# Patient Record
Sex: FEMALE | Race: WHITE | NOT HISPANIC OR LATINO | Employment: OTHER
[De-identification: names, ages, dates, MRNs, and addresses within clinical notes are randomized per-mention and may not be internally consistent; named-entity substitution may affect disease eponyms.]

---

## 2017-05-18 ENCOUNTER — PRIOR ORIGINAL RECORDS (OUTPATIENT)
Dept: OTHER | Age: 62
End: 2017-05-18

## 2017-06-21 ENCOUNTER — PRIOR ORIGINAL RECORDS (OUTPATIENT)
Dept: OTHER | Age: 62
End: 2017-06-21

## 2017-07-27 ENCOUNTER — PRIOR ORIGINAL RECORDS (OUTPATIENT)
Dept: OTHER | Age: 62
End: 2017-07-27

## 2017-09-13 ENCOUNTER — PRIOR ORIGINAL RECORDS (OUTPATIENT)
Dept: OTHER | Age: 62
End: 2017-09-13

## 2017-11-08 ENCOUNTER — PRIOR ORIGINAL RECORDS (OUTPATIENT)
Dept: OTHER | Age: 62
End: 2017-11-08

## 2017-11-15 ENCOUNTER — PRIOR ORIGINAL RECORDS (OUTPATIENT)
Dept: OTHER | Age: 62
End: 2017-11-15

## 2017-12-06 ENCOUNTER — PRIOR ORIGINAL RECORDS (OUTPATIENT)
Dept: OTHER | Age: 62
End: 2017-12-06

## 2017-12-31 ENCOUNTER — PRIOR ORIGINAL RECORDS (OUTPATIENT)
Dept: OTHER | Age: 62
End: 2017-12-31

## 2019-12-20 ENCOUNTER — DIAGNOSTIC TRANS (OUTPATIENT)
Dept: OTHER | Age: 64
End: 2019-12-20

## 2019-12-20 ENCOUNTER — HOSPITAL (OUTPATIENT)
Dept: OTHER | Age: 64
End: 2019-12-20

## 2019-12-20 LAB
ALBUMIN SERPL-MCNC: 4 G/DL (ref 3.6–5.1)
ALBUMIN/GLOB SERPL: 1.1 {RATIO} (ref 1–2.4)
ALP SERPL-CCNC: 63 UNITS/L (ref 45–117)
ALT SERPL-CCNC: 20 UNITS/L
ANALYZER ANC (IANC): ABNORMAL
ANION GAP SERPL CALC-SCNC: 11 MMOL/L (ref 10–20)
AST SERPL-CCNC: 21 UNITS/L
BASOPHILS # BLD: 0.1 K/MCL (ref 0–0.3)
BASOPHILS NFR BLD: 1 %
BILIRUB SERPL-MCNC: 0.7 MG/DL (ref 0.2–1)
BUN SERPL-MCNC: 13 MG/DL (ref 6–20)
BUN/CREAT SERPL: 23 (ref 7–25)
CALCIUM SERPL-MCNC: 9.1 MG/DL (ref 8.4–10.2)
CHLORIDE SERPL-SCNC: 109 MMOL/L (ref 98–107)
CO2 SERPL-SCNC: 27 MMOL/L (ref 21–32)
CREAT SERPL-MCNC: 0.57 MG/DL (ref 0.51–0.95)
D DIMER PPP FEU-MCNC: 0.42 MG/L (FEU)
D DIMER PPP FEU-MCNC: NORMAL
DIFFERENTIAL METHOD BLD: ABNORMAL
EOSINOPHIL # BLD: 0 K/MCL (ref 0.1–0.5)
EOSINOPHIL NFR BLD: 0 %
ERYTHROCYTE [DISTWIDTH] IN BLOOD: 15.3 % (ref 11–15)
GLOBULIN SER-MCNC: 3.6 G/DL (ref 2–4)
GLUCOSE SERPL-MCNC: 118 MG/DL (ref 65–99)
HCT VFR BLD CALC: 42.1 % (ref 36–46.5)
HGB BLD-MCNC: 13.4 G/DL (ref 12–15.5)
IMM GRANULOCYTES # BLD AUTO: 0 K/MCL (ref 0–0.2)
IMM GRANULOCYTES NFR BLD: 0 %
LIPASE SERPL-CCNC: 104 UNITS/L (ref 73–393)
LYMPHOCYTES # BLD: 2 K/MCL (ref 1–4)
LYMPHOCYTES NFR BLD: 28 %
MCH RBC QN AUTO: 28.2 PG (ref 26–34)
MCHC RBC AUTO-ENTMCNC: 31.8 G/DL (ref 32–36.5)
MCV RBC AUTO: 88.6 FL (ref 78–100)
MONOCYTES # BLD: 0.3 K/MCL (ref 0.3–0.9)
MONOCYTES NFR BLD: 5 %
NEUTROPHILS # BLD: 4.8 K/MCL (ref 1.8–7.7)
NEUTROPHILS NFR BLD: 66 %
NEUTS SEG NFR BLD: ABNORMAL %
NRBC (NRBCRE): 0 /100 WBC
PLATELET # BLD: 217 K/MCL (ref 140–450)
POTASSIUM SERPL-SCNC: 3.6 MMOL/L (ref 3.4–5.1)
PROT SERPL-MCNC: 7.6 G/DL (ref 6.4–8.2)
RBC # BLD: 4.75 MIL/MCL (ref 4–5.2)
SODIUM SERPL-SCNC: 143 MMOL/L (ref 135–145)
TROPONIN I SERPL HS-MCNC: 0.12 NG/ML
TROPONIN I SERPL HS-MCNC: 0.12 NG/ML
TROPONIN I SERPL HS-MCNC: ABNORMAL NG/L
TSH SERPL-ACNC: 1.65 MCUNITS/ML (ref 0.35–5)
WBC # BLD: 7.3 K/MCL (ref 4.2–11)

## 2019-12-21 ENCOUNTER — DIAGNOSTIC TRANS (OUTPATIENT)
Dept: OTHER | Age: 64
End: 2019-12-21

## 2019-12-21 ENCOUNTER — HOSPITAL (OUTPATIENT)
Dept: OTHER | Age: 64
End: 2019-12-21

## 2019-12-21 LAB
ALBUMIN SERPL-MCNC: 3.2 G/DL (ref 3.6–5.1)
ALBUMIN/GLOB SERPL: 1.1 {RATIO} (ref 1–2.4)
ALP SERPL-CCNC: 61 UNITS/L (ref 45–117)
ALT SERPL-CCNC: 15 UNITS/L
ANALYZER ANC (IANC): ABNORMAL
ANION GAP SERPL CALC-SCNC: 9 MMOL/L (ref 10–20)
AST SERPL-CCNC: 15 UNITS/L
BASOPHILS # BLD: 0.1 K/MCL (ref 0–0.3)
BASOPHILS NFR BLD: 2 %
BILIRUB SERPL-MCNC: 0.3 MG/DL (ref 0.2–1)
BUN SERPL-MCNC: 15 MG/DL (ref 6–20)
BUN/CREAT SERPL: 26 (ref 7–25)
CALCIUM SERPL-MCNC: 8.5 MG/DL (ref 8.4–10.2)
CHLORIDE SERPL-SCNC: 112 MMOL/L (ref 98–107)
CO2 SERPL-SCNC: 26 MMOL/L (ref 21–32)
CREAT SERPL-MCNC: 0.58 MG/DL (ref 0.51–0.95)
DIFFERENTIAL METHOD BLD: ABNORMAL
EOSINOPHIL # BLD: 0.1 K/MCL (ref 0.1–0.5)
EOSINOPHIL NFR BLD: 1 %
ERYTHROCYTE [DISTWIDTH] IN BLOOD: 15.6 % (ref 11–15)
GLOBULIN SER-MCNC: 2.9 G/DL (ref 2–4)
GLUCOSE SERPL-MCNC: 103 MG/DL (ref 65–99)
HCT VFR BLD CALC: 36.5 % (ref 36–46.5)
HGB BLD-MCNC: 11.5 G/DL (ref 12–15.5)
IMM GRANULOCYTES # BLD AUTO: 0 K/MCL (ref 0–0.2)
IMM GRANULOCYTES NFR BLD: 0 %
LYMPHOCYTES # BLD: 2.1 K/MCL (ref 1–4)
LYMPHOCYTES NFR BLD: 35 %
MAGNESIUM SERPL-MCNC: 2.3 MG/DL (ref 1.7–2.4)
MCH RBC QN AUTO: 28.8 PG (ref 26–34)
MCHC RBC AUTO-ENTMCNC: 31.5 G/DL (ref 32–36.5)
MCV RBC AUTO: 91.3 FL (ref 78–100)
MONOCYTES # BLD: 0.4 K/MCL (ref 0.3–0.9)
MONOCYTES NFR BLD: 6 %
NEUTROPHILS # BLD: 3.4 K/MCL (ref 1.8–7.7)
NEUTROPHILS NFR BLD: 56 %
NEUTS SEG NFR BLD: ABNORMAL %
NRBC (NRBCRE): 0 /100 WBC
PLATELET # BLD: 178 K/MCL (ref 140–450)
POTASSIUM SERPL-SCNC: 3.6 MMOL/L (ref 3.4–5.1)
PROT SERPL-MCNC: 6.1 G/DL (ref 6.4–8.2)
RBC # BLD: 4 MIL/MCL (ref 4–5.2)
SODIUM SERPL-SCNC: 143 MMOL/L (ref 135–145)
TROPONIN I SERPL HS-MCNC: 0.16 NG/ML
TROPONIN I SERPL HS-MCNC: 0.16 NG/ML
TROPONIN I SERPL HS-MCNC: ABNORMAL NG/L
WBC # BLD: 6.1 K/MCL (ref 4.2–11)

## 2019-12-21 PROCEDURE — 99243 OFF/OP CNSLTJ NEW/EST LOW 30: CPT | Performed by: INTERNAL MEDICINE

## 2019-12-21 PROCEDURE — 93306 TTE W/DOPPLER COMPLETE: CPT | Performed by: INTERNAL MEDICINE

## 2020-09-03 ENCOUNTER — APPOINTMENT (OUTPATIENT)
Dept: CT IMAGING | Age: 65
DRG: 842 | End: 2020-09-03
Attending: STUDENT IN AN ORGANIZED HEALTH CARE EDUCATION/TRAINING PROGRAM

## 2020-09-03 ENCOUNTER — HOSPITAL ENCOUNTER (INPATIENT)
Age: 65
LOS: 2 days | Discharge: HOME | DRG: 842 | End: 2020-09-05
Attending: STUDENT IN AN ORGANIZED HEALTH CARE EDUCATION/TRAINING PROGRAM | Admitting: INTERNAL MEDICINE

## 2020-09-03 ENCOUNTER — APPOINTMENT (OUTPATIENT)
Dept: GENERAL RADIOLOGY | Age: 65
DRG: 842 | End: 2020-09-03
Attending: STUDENT IN AN ORGANIZED HEALTH CARE EDUCATION/TRAINING PROGRAM

## 2020-09-03 DIAGNOSIS — M54.50 ACUTE BILATERAL LOW BACK PAIN WITHOUT SCIATICA: ICD-10-CM

## 2020-09-03 DIAGNOSIS — C92.10 CML (CHRONIC MYELOCYTIC LEUKEMIA) (CMD): ICD-10-CM

## 2020-09-03 DIAGNOSIS — D72.829 LEUKOCYTOSIS, UNSPECIFIED TYPE: Primary | ICD-10-CM

## 2020-09-03 LAB
ALBUMIN SERPL-MCNC: 3.3 G/DL (ref 3.6–5.1)
ALBUMIN/GLOB SERPL: 0.9 {RATIO} (ref 1–2.4)
ALP SERPL-CCNC: 65 UNITS/L (ref 45–117)
ALT SERPL-CCNC: 11 UNITS/L
ANION GAP SERPL CALC-SCNC: 11 MMOL/L (ref 10–20)
APPEARANCE UR: CLEAR
APTT PPP: 31 SEC (ref 22–32)
AST SERPL-CCNC: 27 UNITS/L
AT III ACT/NOR PPP CHRO: 76 % (ref 80–124)
BASOPHILS # BLD: 4.9 K/MCL (ref 0–0.3)
BASOPHILS NFR BLD: 2 %
BILIRUB SERPL-MCNC: 0.5 MG/DL (ref 0.2–1)
BILIRUB UR QL STRIP: NEGATIVE
BLASTS NFR BLD: 4 %
BUN SERPL-MCNC: 8 MG/DL (ref 6–20)
BUN/CREAT SERPL: 11 (ref 7–25)
CALCIUM SERPL-MCNC: 9.1 MG/DL (ref 8.4–10.2)
CHLORIDE SERPL-SCNC: 106 MMOL/L (ref 98–107)
CO2 SERPL-SCNC: 26 MMOL/L (ref 21–32)
COLOR UR: ABNORMAL
CREAT SERPL-MCNC: 0.73 MG/DL (ref 0.51–0.95)
DIFFERENTIAL METHOD BLD: ABNORMAL
EOSINOPHIL # BLD: 2.5 K/MCL (ref 0.1–0.5)
EOSINOPHIL NFR BLD: 1 %
ERYTHROCYTE [DISTWIDTH] IN BLOOD: 17.7 % (ref 11–15)
FERRITIN SERPL-MCNC: 499 NG/ML (ref 8–252)
FIBRINOGEN PPP-MCNC: 598 MG/DL (ref 190–425)
GLOBULIN SER-MCNC: 3.7 G/DL (ref 2–4)
GLUCOSE SERPL-MCNC: 177 MG/DL (ref 65–99)
GLUCOSE UR STRIP-MCNC: NEGATIVE MG/DL
HCT VFR BLD CALC: 33.3 % (ref 36–46.5)
HGB BLD-MCNC: 10.5 G/DL (ref 12–15.5)
HGB UR QL STRIP: NEGATIVE
IMM RETICS NFR: 38.7 % (ref 1.5–16)
INR PPP: 1.1
IRON SATN MFR SERPL: 16 % (ref 15–45)
IRON SERPL-MCNC: 36 MCG/DL (ref 50–170)
KETONES UR STRIP-MCNC: NEGATIVE MG/DL
LDH SERPL L TO P-CCNC: 770 UNITS/L (ref 82–240)
LEUKOCYTE ESTERASE UR QL STRIP: NEGATIVE
LYMPHOCYTES # BLD: 4.9 K/MCL (ref 1–4)
LYMPHOCYTES NFR BLD: 2 %
MAGNESIUM SERPL-MCNC: 2.4 MG/DL (ref 1.7–2.4)
MCH RBC QN AUTO: 28.9 PG (ref 26–34)
MCHC RBC AUTO-ENTMCNC: 31.5 G/DL (ref 32–36.5)
MCV RBC AUTO: 91.7 FL (ref 78–100)
METAMYELOCYTES NFR BLD: 7 % (ref 0–2)
MONOCYTES # BLD: 7.4 K/MCL (ref 0.3–0.9)
MONOCYTES NFR BLD: 3 %
MYELOCYTES NFR BLD: 8 %
NEUTROPHILS # BLD: 174 K/MCL (ref 1.8–7.7)
NEUTS BAND NFR BLD: 20 % (ref 0–10)
NEUTS SEG NFR BLD: 51 %
NITRITE UR QL STRIP: NEGATIVE
NRBC BLD MANUAL-RTO: 1 /100 WBC
PATH REV BLD -IMP: NORMAL
PATHOLOGIST NAME: NORMAL
PH UR STRIP: 7 UNITS (ref 5–7)
PHOSPHATE SERPL-MCNC: 4 MG/DL (ref 2.4–4.7)
PLAT MORPH BLD: NORMAL
PLATELET # BLD: 323 K/MCL (ref 140–450)
PLG ACT/NOR PPP CHRO: 133 % (ref 72–139)
POLYCHROMASIA BLD QL SMEAR: ABNORMAL
POTASSIUM SERPL-SCNC: 3.1 MMOL/L (ref 3.4–5.1)
PROMYELOCYTES NFR BLD: 2 %
PROT SERPL-MCNC: 7 G/DL (ref 6.4–8.2)
PROT UR STRIP-MCNC: NEGATIVE MG/DL
PROTHROMBIN TIME: 11.4 SEC (ref 9.7–11.8)
RBC # BLD: 3.63 MIL/MCL (ref 4–5.2)
RETICS #: 72 K/MCL (ref 10–120)
RETICS/RBC NFR: 2.2 % (ref 0.3–2.5)
SODIUM SERPL-SCNC: 140 MMOL/L (ref 135–145)
SP GR UR STRIP: 1.01 (ref 1–1.03)
SPECIMEN SOURCE: ABNORMAL
TIBC SERPL-MCNC: 232 MCG/DL (ref 250–450)
URATE SERPL-MCNC: 9.9 MG/DL (ref 2.6–5.9)
UROBILINOGEN UR STRIP-MCNC: 0.2 MG/DL (ref 0–1)
WBC # BLD: 245 K/MCL (ref 4.2–11)
WBC MORPH BLD: ABNORMAL

## 2020-09-03 PROCEDURE — 81170 ABL1 GENE: CPT

## 2020-09-03 PROCEDURE — 85730 THROMBOPLASTIN TIME PARTIAL: CPT

## 2020-09-03 PROCEDURE — 99285 EMERGENCY DEPT VISIT HI MDM: CPT

## 2020-09-03 PROCEDURE — 85025 COMPLETE CBC W/AUTO DIFF WBC: CPT

## 2020-09-03 PROCEDURE — 85420 FIBRINOLYTIC PLASMINOGEN: CPT

## 2020-09-03 PROCEDURE — 81207 BCR/ABL1 GENE MINOR BP: CPT

## 2020-09-03 PROCEDURE — 84550 ASSAY OF BLOOD/URIC ACID: CPT

## 2020-09-03 PROCEDURE — 13003398 CT LUMBAR SPINE 2D REFORMATTED

## 2020-09-03 PROCEDURE — 84100 ASSAY OF PHOSPHORUS: CPT

## 2020-09-03 PROCEDURE — 10002803 HB RX 637: Performed by: INTERNAL MEDICINE

## 2020-09-03 PROCEDURE — 74177 CT ABD & PELVIS W/CONTRAST: CPT

## 2020-09-03 PROCEDURE — 10002805 HB CONTRAST AGENT: Performed by: STUDENT IN AN ORGANIZED HEALTH CARE EDUCATION/TRAINING PROGRAM

## 2020-09-03 PROCEDURE — 99223 1ST HOSP IP/OBS HIGH 75: CPT | Performed by: INTERNAL MEDICINE

## 2020-09-03 PROCEDURE — 83615 LACTATE (LD) (LDH) ENZYME: CPT

## 2020-09-03 PROCEDURE — 83735 ASSAY OF MAGNESIUM: CPT

## 2020-09-03 PROCEDURE — 96360 HYDRATION IV INFUSION INIT: CPT

## 2020-09-03 PROCEDURE — 85610 PROTHROMBIN TIME: CPT

## 2020-09-03 PROCEDURE — 71045 X-RAY EXAM CHEST 1 VIEW: CPT

## 2020-09-03 PROCEDURE — 82728 ASSAY OF FERRITIN: CPT

## 2020-09-03 PROCEDURE — 85384 FIBRINOGEN ACTIVITY: CPT

## 2020-09-03 PROCEDURE — 82746 ASSAY OF FOLIC ACID SERUM: CPT

## 2020-09-03 PROCEDURE — 99255 IP/OBS CONSLTJ NEW/EST HI 80: CPT

## 2020-09-03 PROCEDURE — 83540 ASSAY OF IRON: CPT

## 2020-09-03 PROCEDURE — 81206 BCR/ABL1 GENE MAJOR BP: CPT

## 2020-09-03 PROCEDURE — 83010 ASSAY OF HAPTOGLOBIN QUANT: CPT

## 2020-09-03 PROCEDURE — 85300 ANTITHROMBIN III ACTIVITY: CPT

## 2020-09-03 PROCEDURE — 80053 COMPREHEN METABOLIC PANEL: CPT

## 2020-09-03 PROCEDURE — 81003 URINALYSIS AUTO W/O SCOPE: CPT

## 2020-09-03 PROCEDURE — 10000083 MISCELLANEOUS LAB ORDER

## 2020-09-03 PROCEDURE — 10000083 HB MISCELLANEOUS LAB

## 2020-09-03 PROCEDURE — 81208 BCR/ABL1 GENE OTHER BP: CPT

## 2020-09-03 PROCEDURE — 10002807 HB RX 258: Performed by: STUDENT IN AN ORGANIZED HEALTH CARE EDUCATION/TRAINING PROGRAM

## 2020-09-03 PROCEDURE — 36415 COLL VENOUS BLD VENIPUNCTURE: CPT

## 2020-09-03 PROCEDURE — 10002803 HB RX 637: Performed by: STUDENT IN AN ORGANIZED HEALTH CARE EDUCATION/TRAINING PROGRAM

## 2020-09-03 PROCEDURE — 10006031 HB ROOM CHARGE TELEMETRY

## 2020-09-03 PROCEDURE — 85046 RETICYTE/HGB CONCENTRATE: CPT

## 2020-09-03 RX ORDER — HYDROXYUREA 500 MG/1
250 CAPSULE ORAL DAILY
Status: DISCONTINUED | OUTPATIENT
Start: 2020-09-03 | End: 2020-09-03

## 2020-09-03 RX ORDER — HYDROXYUREA 500 MG/1
500 CAPSULE ORAL ONCE
Status: DISCONTINUED | OUTPATIENT
Start: 2020-09-03 | End: 2020-09-03

## 2020-09-03 RX ORDER — HYDROXYUREA 500 MG/1
500 CAPSULE ORAL AT BEDTIME
Status: DISCONTINUED | OUTPATIENT
Start: 2020-09-03 | End: 2020-09-04

## 2020-09-03 RX ORDER — HYDROXYUREA 500 MG/1
500 CAPSULE ORAL DAILY
Status: DISCONTINUED | OUTPATIENT
Start: 2020-09-03 | End: 2020-09-03

## 2020-09-03 RX ORDER — DIAZEPAM 5 MG/1
5 TABLET ORAL ONCE
Status: COMPLETED | OUTPATIENT
Start: 2020-09-03 | End: 2020-09-03

## 2020-09-03 RX ORDER — HYDROCODONE BITARTRATE AND ACETAMINOPHEN 5; 325 MG/1; MG/1
2 TABLET ORAL EVERY 4 HOURS PRN
Status: DISCONTINUED | OUTPATIENT
Start: 2020-09-03 | End: 2020-09-05 | Stop reason: HOSPADM

## 2020-09-03 RX ORDER — LIDOCAINE 4 G/G
1 PATCH TOPICAL ONCE
Status: COMPLETED | OUTPATIENT
Start: 2020-09-03 | End: 2020-09-03

## 2020-09-03 RX ORDER — SODIUM CHLORIDE 9 MG/ML
INJECTION, SOLUTION INTRAVENOUS CONTINUOUS
Status: DISCONTINUED | OUTPATIENT
Start: 2020-09-03 | End: 2020-09-04

## 2020-09-03 RX ORDER — HYDROCODONE BITARTRATE AND ACETAMINOPHEN 5; 325 MG/1; MG/1
2 TABLET ORAL ONCE
Status: COMPLETED | OUTPATIENT
Start: 2020-09-03 | End: 2020-09-03

## 2020-09-03 RX ADMIN — SODIUM CHLORIDE: 9 INJECTION, SOLUTION INTRAVENOUS at 13:41

## 2020-09-03 RX ADMIN — LIDOCAINE 1 PATCH: 560 PATCH PERCUTANEOUS; TOPICAL; TRANSDERMAL at 08:14

## 2020-09-03 RX ADMIN — HYDROCODONE BITARTRATE AND ACETAMINOPHEN 2 TABLET: 5; 325 TABLET ORAL at 22:18

## 2020-09-03 RX ADMIN — IOHEXOL 100 ML: 350 INJECTION, SOLUTION INTRAVENOUS at 09:15

## 2020-09-03 RX ADMIN — SODIUM CHLORIDE 1000 ML: 9 INJECTION, SOLUTION INTRAVENOUS at 08:46

## 2020-09-03 RX ADMIN — SODIUM CHLORIDE: 9 INJECTION, SOLUTION INTRAVENOUS at 09:38

## 2020-09-03 RX ADMIN — HYDROXYUREA 500 MG: 500 CAPSULE ORAL at 21:19

## 2020-09-03 RX ADMIN — SODIUM CHLORIDE: 9 INJECTION, SOLUTION INTRAVENOUS at 18:44

## 2020-09-03 RX ADMIN — HYDROCODONE BITARTRATE AND ACETAMINOPHEN 2 TABLET: 5; 325 TABLET ORAL at 08:14

## 2020-09-03 RX ADMIN — DIAZEPAM 5 MG: 5 TABLET ORAL at 08:14

## 2020-09-03 SDOH — HEALTH STABILITY: MENTAL HEALTH: HOW OFTEN DO YOU HAVE A DRINK CONTAINING ALCOHOL?: NEVER

## 2020-09-03 ASSESSMENT — LIFESTYLE VARIABLES
HOW OFTEN DO YOU HAVE A DRINK CONTAINING ALCOHOL: NEVER
HOW MANY STANDARD DRINKS CONTAINING ALCOHOL DO YOU HAVE ON A TYPICAL DAY: 0,1 OR 2
HOW OFTEN DO YOU HAVE 6 OR MORE DRINKS ON ONE OCCASION: NEVER
ALCOHOL_USE_STATUS: NO OR LOW RISK WITH VALIDATED TOOL
AUDIT-C TOTAL SCORE: 0
HOW OFTEN DO YOU HAVE 6 OR MORE DRINKS ON ONE OCCASION: NEVER
HOW OFTEN DO YOU HAVE A DRINK CONTAINING ALCOHOL: NEVER

## 2020-09-03 ASSESSMENT — ENCOUNTER SYMPTOMS
SORE THROAT: 0
FEVER: 0
COUGH: 0
DIARRHEA: 0
NEUROLOGICAL NEGATIVE: 1
BACK PAIN: 1
VOMITING: 0
EYES NEGATIVE: 1
ABDOMINAL PAIN: 0
SHORTNESS OF BREATH: 0
CHILLS: 0

## 2020-09-03 ASSESSMENT — COGNITIVE AND FUNCTIONAL STATUS - GENERAL
ARE YOU DEAF OR DO YOU HAVE SERIOUS DIFFICULTY  HEARING: NO
BECAUSE OF A PHYSICAL, MENTAL, OR EMOTIONAL CONDITION, DO YOU HAVE DIFFICULTY DOING ERRANDS ALONE: NO
DO YOU HAVE SERIOUS DIFFICULTY WALKING OR CLIMBING STAIRS: YES
BECAUSE OF A PHYSICAL, MENTAL, OR EMOTIONAL CONDITION, DO YOU HAVE SERIOUS DIFFICULTY CONCENTRATING, REMEMBERING OR MAKING DECISIONS: NO
DO YOU HAVE DIFFICULTY DRESSING OR BATHING: YES
BECAUSE OF A PHYSICAL, MENTAL, OR EMOTIONAL CONDITION, DO YOU HAVE SERIOUS DIFFICULTY CONCENTRATING, REMEMBERING OR MAKING DECISIONS: NO
ARE YOU BLIND OR DO YOU HAVE SERIOUS DIFFICULTY SEEING, EVEN WHEN WEARING GLASSES: NO
DO YOU HAVE SERIOUS DIFFICULTY WALKING OR CLIMBING STAIRS: YES
ARE YOU DEAF OR DO YOU HAVE SERIOUS DIFFICULTY  HEARING: NO
DO YOU HAVE DIFFICULTY DRESSING OR BATHING: YES
BECAUSE OF A PHYSICAL, MENTAL, OR EMOTIONAL CONDITION, DO YOU HAVE DIFFICULTY DOING ERRANDS ALONE: NO
ARE YOU BLIND OR DO YOU HAVE SERIOUS DIFFICULTY SEEING, EVEN WHEN WEARING GLASSES: NO

## 2020-09-03 ASSESSMENT — COLUMBIA-SUICIDE SEVERITY RATING SCALE - C-SSRS
1. WITHIN THE PAST MONTH, HAVE YOU WISHED YOU WERE DEAD OR WISHED YOU COULD GO TO SLEEP AND NOT WAKE UP?: NO
6. HAVE YOU EVER DONE ANYTHING, STARTED TO DO ANYTHING, OR PREPARED TO DO ANYTHING TO END YOUR LIFE?: NO
2. HAVE YOU ACTUALLY HAD ANY THOUGHTS OF KILLING YOURSELF?: NO
IS THE PATIENT ABLE TO COMPLETE C-SSRS: YES

## 2020-09-03 ASSESSMENT — PATIENT HEALTH QUESTIONNAIRE - PHQ9
IS PATIENT ABLE TO COMPLETE PHQ2 OR PHQ9: YES
CLINICAL INTERPRETATION OF PHQ9 SCORE: NO FURTHER SCREENING NEEDED
CLINICAL INTERPRETATION OF PHQ2 SCORE: NO FURTHER SCREENING NEEDED
SUM OF ALL RESPONSES TO PHQ9 QUESTIONS 1 AND 2: 0
2. FEELING DOWN, DEPRESSED OR HOPELESS: NOT AT ALL
1. LITTLE INTEREST OR PLEASURE IN DOING THINGS: NOT AT ALL
SUM OF ALL RESPONSES TO PHQ9 QUESTIONS 1 AND 2: 0

## 2020-09-03 ASSESSMENT — ACTIVITIES OF DAILY LIVING (ADL)
ADL_BEFORE_ADMISSION: NEEDS/REQUIRES ASSISTANCE
ADL_SHORT_OF_BREATH: NO
TRANSFERRING: NEEDS ASSISTANCE
CHRONIC_PAIN_PRESENT: YES, CANCER
CONTINENCE: NEEDS ASSISTANCE
RECENT_DECLINE_ADL: YES, DECLINE IN AMBULATION/TRANSFERRING, COLLABORATE WITH PROVIDER (T)
DESCRIBE HOW PAIN IMPACTS YOUR LIFE: APPETITE/NUTRITIONAL CONSTRAINTS;RELAXATION/REST/SLEEP
ADL_SCORE: 7
BATHING: NEEDS ASSISTANCE
ADL_SCORE: 12
DESCRIBE HOW PAIN IMPACTS YOUR LIFE: MOBILITY
DRESSING YOURSELF: NEEDS ASSISTANCE
MOBILITY_ASSIST_DEVICES: STANDARD WALKER
ADL_BEFORE_ADMISSION: INDEPENDENT
TOILETING: NEEDS ASSISTANCE
CHRONIC_PAIN_PRESENT: YES, CHRONIC
FEEDING YOURSELF: INDEPENDENT

## 2020-09-03 ASSESSMENT — PAIN SCALES - GENERAL
PAINLEVEL_OUTOF10: 6
PAINLEVEL_OUTOF10: 2
PAINLEVEL_OUTOF10: 2
PAINLEVEL_OUTOF10: 10
PAINLEVEL_OUTOF10: 10
PAINLEVEL_OUTOF10: 0
PAINLEVEL_OUTOF10: 5

## 2020-09-03 ASSESSMENT — PAIN DESCRIPTION - PAIN TYPE: TYPE: ACUTE PAIN

## 2020-09-04 ENCOUNTER — APPOINTMENT (OUTPATIENT)
Dept: CT IMAGING | Age: 65
DRG: 842 | End: 2020-09-04
Attending: NURSE PRACTITIONER

## 2020-09-04 LAB
25(OH)D3+25(OH)D2 SERPL-MCNC: 66.6 NG/ML (ref 30–100)
BASOPHILS # BLD: 4.4 K/MCL (ref 0–0.3)
BASOPHILS NFR BLD: 2 %
BLASTS NFR BLD: 5 %
DIFFERENTIAL METHOD BLD: ABNORMAL
EOSINOPHIL # BLD: 4.4 K/MCL (ref 0.1–0.5)
EOSINOPHIL NFR BLD: 2 %
ERYTHROCYTE [DISTWIDTH] IN BLOOD: 17.7 % (ref 11–15)
FOLATE SERPL-MCNC: 9.1 NG/ML
HAPTOGLOB SERPL-MCNC: 239 MG/DL (ref 36–195)
HCT VFR BLD CALC: 30.1 % (ref 36–46.5)
HGB BLD-MCNC: 9.4 G/DL (ref 12–15.5)
LYMPHOCYTES # BLD: 19.8 K/MCL (ref 1–4)
LYMPHOCYTES NFR BLD: 8 %
MCH RBC QN AUTO: 29.2 PG (ref 26–34)
MCHC RBC AUTO-ENTMCNC: 31.2 G/DL (ref 32–36.5)
MCV RBC AUTO: 93.5 FL (ref 78–100)
METAMYELOCYTES NFR BLD: 8 % (ref 0–2)
MONOCYTES # BLD: 37.4 K/MCL (ref 0.3–0.9)
MONOCYTES NFR BLD: 17 %
MYELOCYTES NFR BLD: 6 %
NEUTROPHILS # BLD: 103.3 K/MCL (ref 1.8–7.7)
NEUTS BAND NFR BLD: 9 % (ref 0–10)
NEUTS SEG NFR BLD: 38 %
NRBC BLD MANUAL-RTO: 0 /100 WBC
PLAT MORPH BLD: NORMAL
PLATELET # BLD: 304 K/MCL (ref 140–450)
POLYCHROMASIA BLD QL SMEAR: ABNORMAL
PROMYELOCYTES NFR BLD: 4 %
RBC # BLD: 3.22 MIL/MCL (ref 4–5.2)
VARIANT LYMPHS NFR BLD: 1 % (ref 0–5)
VIT B12 SERPL-MCNC: >2000 PG/ML (ref 211–911)
WBC # BLD: 219.8 K/MCL (ref 4.2–11)
WBC MORPH BLD: ABNORMAL

## 2020-09-04 PROCEDURE — 36415 COLL VENOUS BLD VENIPUNCTURE: CPT

## 2020-09-04 PROCEDURE — 10006031 HB ROOM CHARGE TELEMETRY

## 2020-09-04 PROCEDURE — 10002803 HB RX 637: Performed by: INTERNAL MEDICINE

## 2020-09-04 PROCEDURE — 99233 SBSQ HOSP IP/OBS HIGH 50: CPT | Performed by: SURGERY

## 2020-09-04 PROCEDURE — 85025 COMPLETE CBC W/AUTO DIFF WBC: CPT

## 2020-09-04 PROCEDURE — 99232 SBSQ HOSP IP/OBS MODERATE 35: CPT | Performed by: INTERNAL MEDICINE

## 2020-09-04 PROCEDURE — 10002805 HB CONTRAST AGENT: Performed by: NURSE PRACTITIONER

## 2020-09-04 PROCEDURE — 10002807 HB RX 258: Performed by: STUDENT IN AN ORGANIZED HEALTH CARE EDUCATION/TRAINING PROGRAM

## 2020-09-04 PROCEDURE — 74176 CT ABD & PELVIS W/O CONTRAST: CPT

## 2020-09-04 PROCEDURE — 82306 VITAMIN D 25 HYDROXY: CPT

## 2020-09-04 RX ORDER — DOCUSATE SODIUM 100 MG/1
100 CAPSULE, LIQUID FILLED ORAL 2 TIMES DAILY
Status: DISCONTINUED | OUTPATIENT
Start: 2020-09-04 | End: 2020-09-05 | Stop reason: HOSPADM

## 2020-09-04 RX ADMIN — HYDROCODONE BITARTRATE AND ACETAMINOPHEN 2 TABLET: 5; 325 TABLET ORAL at 17:11

## 2020-09-04 RX ADMIN — HYDROCODONE BITARTRATE AND ACETAMINOPHEN 2 TABLET: 5; 325 TABLET ORAL at 09:48

## 2020-09-04 RX ADMIN — DOCUSATE SODIUM 100 MG: 100 CAPSULE, LIQUID FILLED ORAL at 22:19

## 2020-09-04 RX ADMIN — IOHEXOL 12.5 ML: 350 INJECTION, SOLUTION INTRAVENOUS at 09:24

## 2020-09-04 RX ADMIN — SODIUM CHLORIDE: 9 INJECTION, SOLUTION INTRAVENOUS at 00:00

## 2020-09-04 RX ADMIN — IOHEXOL 12.5 ML: 350 INJECTION, SOLUTION INTRAVENOUS at 10:43

## 2020-09-04 RX ADMIN — DOCUSATE SODIUM 100 MG: 100 CAPSULE, LIQUID FILLED ORAL at 12:39

## 2020-09-04 ASSESSMENT — PAIN SCALES - GENERAL
PAINLEVEL_OUTOF10: 6
PAINLEVEL_OUTOF10: 0

## 2020-09-05 VITALS
HEART RATE: 64 BPM | DIASTOLIC BLOOD PRESSURE: 85 MMHG | HEIGHT: 60 IN | TEMPERATURE: 97 F | RESPIRATION RATE: 16 BRPM | WEIGHT: 117.28 LBS | BODY MASS INDEX: 23.03 KG/M2 | OXYGEN SATURATION: 95 % | SYSTOLIC BLOOD PRESSURE: 153 MMHG

## 2020-09-05 LAB
ALBUMIN SERPL-MCNC: 2.7 G/DL (ref 3.6–5.1)
ALBUMIN SMEAR (DALB): ABNORMAL
ALBUMIN/GLOB SERPL: 0.7 {RATIO} (ref 1–2.4)
ALP SERPL-CCNC: 52 UNITS/L (ref 45–117)
ALT SERPL-CCNC: 9 UNITS/L
ANION GAP SERPL CALC-SCNC: 8 MMOL/L (ref 10–20)
AST SERPL-CCNC: 21 UNITS/L
BASOPHILS # BLD: 2.1 K/MCL (ref 0–0.3)
BASOPHILS NFR BLD: 1 %
BILIRUB SERPL-MCNC: 0.4 MG/DL (ref 0.2–1)
BLASTS NFR BLD: 3 %
BUN SERPL-MCNC: 10 MG/DL (ref 6–20)
BUN/CREAT SERPL: 15 (ref 7–25)
CALCIUM SERPL-MCNC: 8.5 MG/DL (ref 8.4–10.2)
CHLORIDE SERPL-SCNC: 112 MMOL/L (ref 98–107)
CO2 SERPL-SCNC: 27 MMOL/L (ref 21–32)
CREAT SERPL-MCNC: 0.66 MG/DL (ref 0.51–0.95)
DIFFERENTIAL METHOD BLD: ABNORMAL
EOSINOPHIL # BLD: 0 K/MCL (ref 0.1–0.5)
EOSINOPHIL NFR BLD: 0 %
ERYTHROCYTE [DISTWIDTH] IN BLOOD: 17.9 % (ref 11–15)
GLOBULIN SER-MCNC: 3.7 G/DL (ref 2–4)
GLUCOSE SERPL-MCNC: 94 MG/DL (ref 65–99)
HCT VFR BLD CALC: 30.1 % (ref 36–46.5)
HGB BLD-MCNC: 9.5 G/DL (ref 12–15.5)
LYMPHOCYTES # BLD: 8.3 K/MCL (ref 1–4)
LYMPHOCYTES NFR BLD: 4 %
MCH RBC QN AUTO: 29.2 PG (ref 26–34)
MCHC RBC AUTO-ENTMCNC: 31.6 G/DL (ref 32–36.5)
MCV RBC AUTO: 92.6 FL (ref 78–100)
METAMYELOCYTES NFR BLD: 15 % (ref 0–2)
MONOCYTES # BLD: 16.6 K/MCL (ref 0.3–0.9)
MONOCYTES NFR BLD: 8 %
MYELOCYTES NFR BLD: 6 %
NEUTROPHILS # BLD: 120.5 K/MCL (ref 1.8–7.7)
NEUTS BAND NFR BLD: 14 % (ref 0–10)
NEUTS SEG NFR BLD: 44 %
NRBC BLD MANUAL-RTO: 1 /100 WBC
PLAT MORPH BLD: NORMAL
PLATELET # BLD: 284 K/MCL (ref 140–450)
POLYCHROMASIA BLD QL SMEAR: ABNORMAL
POTASSIUM SERPL-SCNC: 3.3 MMOL/L (ref 3.4–5.1)
PROMYELOCYTES NFR BLD: 5 %
PROT SERPL-MCNC: 6.4 G/DL (ref 6.4–8.2)
RBC # BLD: 3.25 MIL/MCL (ref 4–5.2)
SODIUM SERPL-SCNC: 144 MMOL/L (ref 135–145)
WBC # BLD: 207.7 K/MCL (ref 4.2–11)
WBC MORPH BLD: ABNORMAL

## 2020-09-05 PROCEDURE — 80053 COMPREHEN METABOLIC PANEL: CPT

## 2020-09-05 PROCEDURE — 10002803 HB RX 637: Performed by: INTERNAL MEDICINE

## 2020-09-05 PROCEDURE — 36415 COLL VENOUS BLD VENIPUNCTURE: CPT

## 2020-09-05 PROCEDURE — 85025 COMPLETE CBC W/AUTO DIFF WBC: CPT

## 2020-09-05 RX ORDER — POLYETHYLENE GLYCOL 3350 17 G/17G
17 POWDER, FOR SOLUTION ORAL DAILY
Status: DISCONTINUED | OUTPATIENT
Start: 2020-09-05 | End: 2020-09-05 | Stop reason: HOSPADM

## 2020-09-05 RX ORDER — POTASSIUM CHLORIDE 20 MEQ/1
40 TABLET, EXTENDED RELEASE ORAL ONCE
Status: COMPLETED | OUTPATIENT
Start: 2020-09-05 | End: 2020-09-05

## 2020-09-05 RX ORDER — POLYETHYLENE GLYCOL 3350 17 G/17G
17 POWDER, FOR SOLUTION ORAL DAILY
INPATIENT
Start: 2020-09-05

## 2020-09-05 RX ORDER — BISACODYL 10 MG
10 SUPPOSITORY, RECTAL RECTAL DAILY PRN
Status: DISCONTINUED | OUTPATIENT
Start: 2020-09-05 | End: 2020-09-05 | Stop reason: HOSPADM

## 2020-09-05 RX ADMIN — BISACODYL 10 MG: 10 SUPPOSITORY RECTAL at 12:46

## 2020-09-05 RX ADMIN — POTASSIUM CHLORIDE 40 MEQ: 1500 TABLET, EXTENDED RELEASE ORAL at 15:10

## 2020-09-05 RX ADMIN — DOCUSATE SODIUM 100 MG: 100 CAPSULE, LIQUID FILLED ORAL at 08:57

## 2020-09-05 RX ADMIN — HYDROCODONE BITARTRATE AND ACETAMINOPHEN 2 TABLET: 5; 325 TABLET ORAL at 05:21

## 2020-09-05 ASSESSMENT — PAIN SCALES - GENERAL
PAINLEVEL_OUTOF10: 4
PAINLEVEL_OUTOF10: 8

## 2020-09-06 LAB
PATHOLOGIST NAME: ABNORMAL
T(9;22)(ABL1,BCR) NFR BLD/T: 173.48 %
T(9;22)(ABL1,BCR)/CONTROL BLD/T-LNRTO: 0.38
T(ABL1,BCR)P210 BLD/T QL: POSITIVE
T(ABL1,BCR)P210/CONTROL (IS) BLD/T: 41.36 %

## 2020-09-15 LAB
BCR/ABL1 KINASE DOMAIN MUT ANL BLD/T: 210
PATH REPORT.FINAL DX SPEC: NEGATIVE
REF LAB NAME: NORMAL
REF LAB TEST NAME: NORMAL
REF LAB TEST RESULTS: NORMAL
SPECIMEN SOURCE: NORMAL

## 2020-10-03 ENCOUNTER — APPOINTMENT (OUTPATIENT)
Dept: GENERAL RADIOLOGY | Facility: HOSPITAL | Age: 65
End: 2020-10-03
Attending: EMERGENCY MEDICINE
Payer: COMMERCIAL

## 2020-10-03 ENCOUNTER — APPOINTMENT (OUTPATIENT)
Dept: ULTRASOUND IMAGING | Facility: HOSPITAL | Age: 65
End: 2020-10-03
Attending: EMERGENCY MEDICINE
Payer: COMMERCIAL

## 2020-10-03 ENCOUNTER — HOSPITAL ENCOUNTER (EMERGENCY)
Facility: HOSPITAL | Age: 65
Discharge: HOME | End: 2020-10-03
Attending: EMERGENCY MEDICINE
Payer: COMMERCIAL

## 2020-10-03 VITALS
TEMPERATURE: 98 F | BODY MASS INDEX: 21.6 KG/M2 | OXYGEN SATURATION: 94 % | WEIGHT: 110 LBS | RESPIRATION RATE: 20 BRPM | HEART RATE: 80 BPM | SYSTOLIC BLOOD PRESSURE: 158 MMHG | DIASTOLIC BLOOD PRESSURE: 63 MMHG | HEIGHT: 60 IN

## 2020-10-03 DIAGNOSIS — K59.00 CONSTIPATION, UNSPECIFIED CONSTIPATION TYPE: Primary | ICD-10-CM

## 2020-10-03 DIAGNOSIS — C92.10 CML (CHRONIC MYELOCYTIC LEUKEMIA) (HCC): ICD-10-CM

## 2020-10-03 PROCEDURE — 85027 COMPLETE CBC AUTOMATED: CPT | Performed by: EMERGENCY MEDICINE

## 2020-10-03 PROCEDURE — 83690 ASSAY OF LIPASE: CPT | Performed by: EMERGENCY MEDICINE

## 2020-10-03 PROCEDURE — 85060 BLOOD SMEAR INTERPRETATION: CPT | Performed by: EMERGENCY MEDICINE

## 2020-10-03 PROCEDURE — 93005 ELECTROCARDIOGRAM TRACING: CPT

## 2020-10-03 PROCEDURE — 96375 TX/PRO/DX INJ NEW DRUG ADDON: CPT

## 2020-10-03 PROCEDURE — 74019 RADEX ABDOMEN 2 VIEWS: CPT | Performed by: EMERGENCY MEDICINE

## 2020-10-03 PROCEDURE — 85025 COMPLETE CBC W/AUTO DIFF WBC: CPT | Performed by: EMERGENCY MEDICINE

## 2020-10-03 PROCEDURE — 76705 ECHO EXAM OF ABDOMEN: CPT | Performed by: EMERGENCY MEDICINE

## 2020-10-03 PROCEDURE — 80076 HEPATIC FUNCTION PANEL: CPT | Performed by: EMERGENCY MEDICINE

## 2020-10-03 PROCEDURE — 99285 EMERGENCY DEPT VISIT HI MDM: CPT

## 2020-10-03 PROCEDURE — 85007 BL SMEAR W/DIFF WBC COUNT: CPT | Performed by: EMERGENCY MEDICINE

## 2020-10-03 PROCEDURE — C9113 INJ PANTOPRAZOLE SODIUM, VIA: HCPCS | Performed by: EMERGENCY MEDICINE

## 2020-10-03 PROCEDURE — 96374 THER/PROPH/DIAG INJ IV PUSH: CPT

## 2020-10-03 PROCEDURE — 84484 ASSAY OF TROPONIN QUANT: CPT | Performed by: EMERGENCY MEDICINE

## 2020-10-03 PROCEDURE — 80048 BASIC METABOLIC PNL TOTAL CA: CPT | Performed by: EMERGENCY MEDICINE

## 2020-10-03 PROCEDURE — 93010 ELECTROCARDIOGRAM REPORT: CPT | Performed by: EMERGENCY MEDICINE

## 2020-10-03 RX ORDER — MAGNESIUM CARB/ALUMINUM HYDROX 105-160MG
296 TABLET,CHEWABLE ORAL ONCE
Status: COMPLETED | OUTPATIENT
Start: 2020-10-03 | End: 2020-10-03

## 2020-10-03 RX ORDER — KETOROLAC TROMETHAMINE 30 MG/ML
15 INJECTION, SOLUTION INTRAMUSCULAR; INTRAVENOUS ONCE
Status: COMPLETED | OUTPATIENT
Start: 2020-10-03 | End: 2020-10-03

## 2020-10-03 RX ORDER — POLYETHYLENE GLYCOL 3350 17 G/17G
17 POWDER, FOR SOLUTION ORAL DAILY PRN
Qty: 12 EACH | Refills: 0 | Status: SHIPPED | OUTPATIENT
Start: 2020-10-03 | End: 2020-11-02

## 2020-10-03 NOTE — ED NOTES
72year old female here with  for RUQ pain for awhile, pt reports hx of leukemia and constipation, last bowel movement 12 hrs ago

## 2020-10-03 NOTE — ED PROVIDER NOTES
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 She is not toxic-appearing or diaphoretic. HENT:      Head: Normocephalic and atraumatic. Eyes:      Conjunctiva/sclera: Conjunctivae normal.      Pupils: Pupils are equal, round, and reactive to light.    Neck:      Musculoskelet Manual 25.58 (*)     Myelocyte Absolute Manual 22.74 (*)     Promyelocyte Absolute Manual 5.68 (*)     Blast Absolute Manual 2.84 (*)     NRBC 1 (*)     RBC Morphology See morphology below (*)     Platelet Morphology See morphology below (*)     Giant plat 10/3/2020  CONCLUSION:  1. Moderate stool throughout the colon consistent constipation/fecal retention. 2. Nonspecific nonobstructive abdominal gas pattern.     Dictated by (CST): Ra Gannon MD on 10/03/2020 at 1:33 PM     Finalized by (CST): ReplySend

## 2020-10-03 NOTE — ED INITIAL ASSESSMENT (HPI)
Patient presents to ER with c/o severe left sided abdominal pain. +cough. Patient is a [facility] patient.  Hx of constipation

## 2020-10-09 LAB
ALBUMIN/GLOB SERPL: 1.8 (CALC) (ref 1–2.5)
ALBUMIN/GLOB SERPL: 1.8 (CALC) (ref 1–2.5)
ALBUMIN/GLOB SERPL: 2 (CALC) (ref 1–2.5)
ALBUMIN: 4.4 G/DL (ref 3.6–5.1)
ALBUMIN: 4.5 G/DL (ref 3.6–5.1)
ALBUMIN: 4.5 G/DL (ref 3.6–5.1)
ALKALINE PHOSPHATASE: 55 UNIT/L (ref 33–130)
ALKALINE PHOSPHATASE: 58 UNIT/L (ref 33–130)
ALKALINE PHOSPHATASE: 63 UNIT/L (ref 33–130)
ALT: 10 UNIT/L (ref 6–29)
ALT: 8 UNIT/L (ref 6–29)
ALT: 9 UNIT/L (ref 6–29)
AST: 17 UNIT/L (ref 10–35)
AST: 17 UNIT/L (ref 10–35)
AST: 20 UNIT/L (ref 10–35)
BASO%: 0.4 %
BASO%: 0.4 %
BASO%: 0.8 %
BASO%: 0.8 %
BASO%: 0.9 %
BASO%: 0.9 %
BASO: 0 10^3/UL
BASO: 0 10^3/UL
BASO: 0.1 10^3/UL
BILIRUBIN, TOTAL: 0.4 MG/DL (ref 0.2–1.2)
BILIRUBIN, TOTAL: 0.6 MG/DL (ref 0.2–1.2)
BILIRUBIN, TOTAL: 0.6 MG/DL (ref 0.2–1.2)
BUN/CREATININE RATIO: ABNORMAL (CALC) (ref 6–22)
BUN/CREATININE RATIO: NORMAL (CALC) (ref 6–22)
BUN/CREATININE RATIO: NORMAL (CALC) (ref 6–22)
CALCIUM: 9.2 MG/DL (ref 8.6–10.4)
CALCIUM: 9.2 MG/DL (ref 8.6–10.4)
CALCIUM: 9.6 MG/DL (ref 8.6–10.4)
CARBON DIOXIDE: 24 MMOL/L (ref 20–31)
CARBON DIOXIDE: 25 MMOL/L (ref 20–31)
CARBON DIOXIDE: 25 MMOL/L (ref 20–31)
CHLORIDE: 107 MMOL/L (ref 98–110)
CHLORIDE: 108 MMOL/L (ref 98–110)
CHLORIDE: 108 MMOL/L (ref 98–110)
CRCL (C&G) (MOSAIQ HL): 73.68 ML/MIN
CRCL (C&G) (MOSAIQ HL): 79.65 ML/MIN
CRCL (C&G) (MOSAIQ HL): 79.77 ML/MIN
CREATININE CLEARANCE (MOSAIQ HL): 74.3 ML/MIN
CREATININE CLEARANCE (MOSAIQ HL): 81.6 ML/MIN
CREATININE CLEARANCE (MOSAIQ HL): 81.6 ML/MIN
CREATININE: 0.71 MG/DL (ref 0.5–0.99)
CREATININE: 0.71 MG/DL (ref 0.5–0.99)
CREATININE: 0.78 MG/DL (ref 0.5–0.99)
EGFR AFRICAN AMERICAN: 106 ML/MIN/1.73M2
EGFR AFRICAN AMERICAN: 106 ML/MIN/1.73M2
EGFR AFRICAN AMERICAN: 94 ML/MIN/1.73M2
EGFR NON-AFR. AMERICAN: 81 ML/MIN/1.73M2
EGFR NON-AFR. AMERICAN: 91 ML/MIN/1.73M2
EGFR NON-AFR. AMERICAN: 91 ML/MIN/1.73M2
EOS%: 0.9 %
EOS%: 1.3 %
EOS%: 1.3 %
EOS%: 1.4 %
EOS%: 1.6 %
EOS%: 3.8 %
EOS: 0.1 10^3/UL
EOS: 0.3 10^3/UL
GLOBULIN: 2.3 G/DL (CALC) (ref 1.9–3.7)
GLOBULIN: 2.5 G/DL (CALC) (ref 1.9–3.7)
GLOBULIN: 2.5 G/DL (CALC) (ref 1.9–3.7)
GLUCOSE: 104 MG/DL (ref 65–99)
GLUCOSE: 94 MG/DL (ref 65–99)
GLUCOSE: 96 MG/DL (ref 65–99)
HCT: 31.9 % (ref 38–54)
HCT: 34 % (ref 38–54)
HCT: 34.9 % (ref 38–54)
HCT: 35.3 % (ref 38–54)
HCT: 36.5 % (ref 38–54)
HCT: 37.8 % (ref 38–54)
HGB: 10.4 G/DL (ref 12–18)
HGB: 11.2 G/DL (ref 12–18)
HGB: 11.5 G/DL (ref 12–18)
HGB: 11.8 G/DL (ref 12–18)
HGB: 12.2 G/DL (ref 12–18)
HGB: 12.5 G/DL (ref 12–18)
LYMPH%: 20.8 % (ref 12–44)
LYMPH%: 26 % (ref 12–44)
LYMPH%: 27.8 % (ref 12–44)
LYMPH%: 28.8 % (ref 12–44)
LYMPH%: 31.9 % (ref 12–44)
LYMPH%: 33.3 % (ref 12–44)
LYMPH: 1.5 10^3/UL (ref 0.8–2.8)
LYMPH: 1.7 10^3/UL (ref 0.8–2.8)
LYMPH: 1.7 10^3/UL (ref 0.8–2.8)
LYMPH: 1.9 10^3/UL (ref 0.8–2.8)
LYMPH: 1.9 10^3/UL (ref 0.8–2.8)
LYMPH: 2 10^3/UL (ref 0.8–2.8)
MCH: 29.5 PG (ref 26–33)
MCH: 30.1 PG (ref 26–33)
MCH: 30.2 PG (ref 26–33)
MCH: 30.3 PG (ref 26–33)
MCH: 30.5 PG (ref 26–33)
MCH: 30.6 PG (ref 26–33)
MCHC: 32.6 G/DL (ref 31–36)
MCHC: 32.9 G/DL (ref 31–36)
MCHC: 33 G/DL (ref 31–36)
MCHC: 33.1 G/DL (ref 31–36)
MCHC: 33.4 G/DL (ref 31–36)
MCHC: 33.4 G/DL (ref 31–36)
MCV: 90.3 FML (ref 82–100)
MCV: 90.6 FML (ref 82–100)
MCV: 91.1 FML (ref 82–100)
MCV: 91.5 FML (ref 82–100)
MCV: 91.8 FML (ref 82–100)
MCV: 92.6 FML (ref 82–100)
MONO%: 4.6 % (ref 2–12)
MONO%: 4.9 % (ref 2–12)
MONO%: 6 % (ref 2–12)
MONO%: 6.4 % (ref 2–12)
MONO%: 6.6 % (ref 2–12)
MONO%: 7.3 % (ref 2–12)
MONO: 0.3 10^3/UL (ref 0.2–1)
MONO: 0.4 10^3/UL (ref 0.2–1)
MONO: 0.5 10^3/UL (ref 0.2–1)
MONO: 0.5 10^3/UL (ref 0.2–1)
MPV: 10.1 FML (ref 8.6–11.7)
MPV: 10.1 FML (ref 8.6–11.7)
MPV: 10.2 FML (ref 8.6–11.7)
MPV: 8.4 FML (ref 8.6–11.7)
MPV: 9.4 FML (ref 8.6–11.7)
MPV: 9.8 FML (ref 8.6–11.7)
NEUT%: 58 % (ref 47–76)
NEUT%: 59.3 % (ref 47–76)
NEUT%: 61.1 % (ref 47–76)
NEUT%: 64.5 % (ref 47–76)
NEUT%: 66.9 % (ref 47–76)
NEUT%: 71.3 % (ref 47–76)
NEUT: 3.3 10^3/UL (ref 1.5–7.1)
NEUT: 3.4 10^3/UL (ref 1.5–7.1)
NEUT: 3.7 10^3/UL (ref 1.5–7.1)
NEUT: 3.9 10^3/UL (ref 1.5–7.1)
NEUT: 4.3 10^3/UL (ref 1.5–7.1)
NEUT: 5.8 10^3/UL (ref 1.5–7.1)
PLT: 134 10^3/UL (ref 150–375)
PLT: 152 10^3/UL (ref 150–375)
PLT: 158 10^3/UL (ref 150–375)
PLT: 160 10^3/UL (ref 150–375)
PLT: 172 10^3/UL (ref 150–375)
PLT: 219 10^3/UL (ref 150–375)
POTASSIUM: 3.5 MMOL/L (ref 3.5–5.3)
POTASSIUM: 3.7 MMOL/L (ref 3.5–5.3)
POTASSIUM: 3.9 MMOL/L (ref 3.5–5.3)
PROTEIN, TOTAL: 6.8 G/DL (ref 6.1–8.1)
PROTEIN, TOTAL: 6.9 G/DL (ref 6.1–8.1)
PROTEIN, TOTAL: 7 G/DL (ref 6.1–8.1)
RBC: 3.52 10^6/UL (ref 4.2–6.2)
RBC: 3.67 10^6/UL (ref 4.2–6.2)
RBC: 3.8 10^6/UL (ref 4.2–6.2)
RBC: 3.86 10^6/UL (ref 4.2–6.2)
RBC: 4.04 10^6/UL (ref 4.2–6.2)
RBC: 4.15 10^6/UL (ref 4.2–6.2)
RDW-CV: 14.8 %
RDW-CV: 14.9 %
RDW-CV: 15 %
RDW-CV: 15 %
RDW-CV: 15.2 %
RDW-CV: 15.3 %
RDW-SD: 47.8 FML (ref 36–50)
RDW-SD: 48.4 FML (ref 36–50)
RDW-SD: 49 FML (ref 36–50)
RDW-SD: 49.1 FML (ref 36–50)
RDW-SD: 49.8 FML (ref 36–50)
RDW-SD: 50 FML (ref 36–50)
SODIUM: 142 MMOL/L (ref 135–146)
SODIUM: 142 MMOL/L (ref 135–146)
SODIUM: 143 MMOL/L (ref 135–146)
UREA NITROGEN (BUN): 13 MG/DL (ref 7–25)
UREA NITROGEN (BUN): 15 MG/DL (ref 7–25)
UREA NITROGEN (BUN): 9 MG/DL (ref 7–25)
WBC: 5.3 10^3/UL (ref 4.3–11)
WBC: 5.7 10^3/UL (ref 4.3–11)
WBC: 6.1 10^3/UL (ref 4.3–11)
WBC: 6.4 10^3/UL (ref 4.3–11)
WBC: 6.6 10^3/UL (ref 4.3–11)
WBC: 8.2 10^3/UL (ref 4.3–11)

## 2020-10-11 VITALS
SYSTOLIC BLOOD PRESSURE: 142 MMHG | BODY MASS INDEX: 26.45 KG/M2 | WEIGHT: 139.99 LBS | DIASTOLIC BLOOD PRESSURE: 72 MMHG

## 2020-10-11 VITALS — WEIGHT: 137.59 LBS | DIASTOLIC BLOOD PRESSURE: 82 MMHG | SYSTOLIC BLOOD PRESSURE: 130 MMHG | BODY MASS INDEX: 26 KG/M2

## 2020-10-11 VITALS
DIASTOLIC BLOOD PRESSURE: 78 MMHG | BODY MASS INDEX: 24.95 KG/M2 | HEIGHT: 62 IN | WEIGHT: 135.6 LBS | SYSTOLIC BLOOD PRESSURE: 136 MMHG

## 2020-10-11 VITALS
DIASTOLIC BLOOD PRESSURE: 76 MMHG | SYSTOLIC BLOOD PRESSURE: 142 MMHG | BODY MASS INDEX: 25.58 KG/M2 | WEIGHT: 135.41 LBS

## 2020-10-11 VITALS
BODY MASS INDEX: 25.58 KG/M2 | SYSTOLIC BLOOD PRESSURE: 162 MMHG | WEIGHT: 135.41 LBS | DIASTOLIC BLOOD PRESSURE: 78 MMHG